# Patient Record
Sex: MALE | Race: AMERICAN INDIAN OR ALASKA NATIVE
[De-identification: names, ages, dates, MRNs, and addresses within clinical notes are randomized per-mention and may not be internally consistent; named-entity substitution may affect disease eponyms.]

---

## 2018-01-02 ENCOUNTER — HOSPITAL ENCOUNTER (EMERGENCY)
Dept: HOSPITAL 5 - ED | Age: 38
Discharge: HOME | End: 2018-01-02
Payer: MEDICAID

## 2018-01-02 VITALS — DIASTOLIC BLOOD PRESSURE: 88 MMHG | SYSTOLIC BLOOD PRESSURE: 145 MMHG

## 2018-01-02 DIAGNOSIS — Z91.013: ICD-10-CM

## 2018-01-02 DIAGNOSIS — R53.1: ICD-10-CM

## 2018-01-02 DIAGNOSIS — J06.9: Primary | ICD-10-CM

## 2018-01-02 PROCEDURE — 87400 INFLUENZA A/B EACH AG IA: CPT

## 2018-01-02 PROCEDURE — 71046 X-RAY EXAM CHEST 2 VIEWS: CPT

## 2018-08-15 ENCOUNTER — HOSPITAL ENCOUNTER (EMERGENCY)
Dept: HOSPITAL 5 - ED | Age: 38
Discharge: LEFT BEFORE DISCHARGE | End: 2018-08-15
Payer: MEDICAID

## 2018-08-15 ENCOUNTER — HOSPITAL ENCOUNTER (EMERGENCY)
Dept: HOSPITAL 5 - ED | Age: 38
Discharge: HOME | End: 2018-08-15
Payer: MEDICAID

## 2018-08-15 VITALS — SYSTOLIC BLOOD PRESSURE: 135 MMHG | DIASTOLIC BLOOD PRESSURE: 76 MMHG

## 2018-08-15 VITALS — DIASTOLIC BLOOD PRESSURE: 87 MMHG | SYSTOLIC BLOOD PRESSURE: 154 MMHG

## 2018-08-15 DIAGNOSIS — K08.89: Primary | ICD-10-CM

## 2018-08-15 DIAGNOSIS — R09.89: ICD-10-CM

## 2018-08-15 DIAGNOSIS — R20.2: Primary | ICD-10-CM

## 2018-08-15 DIAGNOSIS — Z91.013: ICD-10-CM

## 2018-08-15 DIAGNOSIS — Z53.21: ICD-10-CM

## 2018-08-15 DIAGNOSIS — R20.0: ICD-10-CM

## 2018-08-15 LAB
ALBUMIN SERPL-MCNC: 3.9 G/DL (ref 3.9–5)
ALT SERPL-CCNC: 13 UNITS/L (ref 7–56)
APTT BLD: 28.3 SEC. (ref 24.2–36.6)
BASOPHILS # (AUTO): 0 K/MM3 (ref 0–0.1)
BASOPHILS NFR BLD AUTO: 0.5 % (ref 0–1.8)
BUN SERPL-MCNC: 11 MG/DL (ref 9–20)
BUN/CREAT SERPL: 12 %
CALCIUM SERPL-MCNC: 9.1 MG/DL (ref 8.4–10.2)
EOSINOPHIL # BLD AUTO: 0.2 K/MM3 (ref 0–0.4)
EOSINOPHIL NFR BLD AUTO: 2 % (ref 0–4.3)
HCT VFR BLD CALC: 46.1 % (ref 35.5–45.6)
HEMOLYSIS INDEX: 8
HGB BLD-MCNC: 15.6 GM/DL (ref 11.8–15.2)
INR PPP: 0.95 (ref 0.87–1.13)
LYMPHOCYTES # BLD AUTO: 3.1 K/MM3 (ref 1.2–5.4)
LYMPHOCYTES NFR BLD AUTO: 33.2 % (ref 13.4–35)
MCH RBC QN AUTO: 31 PG (ref 28–32)
MCHC RBC AUTO-ENTMCNC: 34 % (ref 32–34)
MCV RBC AUTO: 92 FL (ref 84–94)
MONOCYTES # (AUTO): 0.7 K/MM3 (ref 0–0.8)
MONOCYTES % (AUTO): 7.2 % (ref 0–7.3)
PLATELET # BLD: 240 K/MM3 (ref 140–440)
RBC # BLD AUTO: 5 M/MM3 (ref 3.65–5.03)

## 2018-08-15 PROCEDURE — 80053 COMPREHEN METABOLIC PANEL: CPT

## 2018-08-15 PROCEDURE — 85730 THROMBOPLASTIN TIME PARTIAL: CPT

## 2018-08-15 PROCEDURE — 70450 CT HEAD/BRAIN W/O DYE: CPT

## 2018-08-15 PROCEDURE — 85610 PROTHROMBIN TIME: CPT

## 2018-08-15 PROCEDURE — 72125 CT NECK SPINE W/O DYE: CPT

## 2018-08-15 PROCEDURE — 70360 X-RAY EXAM OF NECK: CPT

## 2018-08-15 PROCEDURE — 36415 COLL VENOUS BLD VENIPUNCTURE: CPT

## 2018-08-15 PROCEDURE — 85025 COMPLETE CBC W/AUTO DIFF WBC: CPT

## 2018-08-15 NOTE — EMERGENCY DEPARTMENT REPORT
ED General Adult HPI





- General


Chief complaint: Medical Clearance


Stated complaint: HAND/PAIN


Time Seen by Provider: 08/15/18 17:03


Source: patient


Mode of arrival: Ambulatory


Limitations: No Limitations





- History of Present Illness


Initial comments: 





Patient had a dental procedure done yesterday.  He had his left upper tooth 

pulled out by the dentist.  After the procedure he started having numbness and 

tingling of both hands and sensation of foreign body in throat.  This has been 

on going since 5 PM yesterday.  He denies any weakness of both extremities.


-: Sudden, days(s) (1)


Location: upper extremity


Radiation: non-radiation


Severity scale (0 -10): 4


Quality: other (Numbness and tingling)


Consistency: constant


Improves with: none


Worsens with: none


Associated Symptoms: other (Foreign body sensation in the throat.).  denies: 

nausea/vomiting, shortness of breath


Treatments Prior to Arrival: none





- Related Data


 Previous Rx's











 Medication  Instructions  Recorded  Last Taken  Type


 


Azithromycin [Zithromax Z-COREY] 250 mg PO DAILY #6 tablet 01/02/18 Unknown Rx


 


Benzonatate [Tessalon Perle] 100 mg PO Q6H #20 capsule 01/02/18 Unknown Rx


 


predniSONE [Deltasone] 40 mg PO QDAY #5 tab 01/02/18 Unknown Rx











 Allergies











Allergy/AdvReac Type Severity Reaction Status Date / Time


 


ALL SEAFOOD AdvReac Severe Anaphylaxis Uncoded 11/16/16 07:55














ED Review of Systems


ROS: 


Stated complaint: HAND/PAIN


Other details as noted in HPI





Comment: All other systems reviewed and negative


Constitutional: denies: chills, fever


Eyes: denies: eye pain


ENT: denies: ear pain


Respiratory: denies: cough, shortness of breath


Cardiovascular: denies: chest pain, palpitations


Endocrine: no symptoms reported


Gastrointestinal: denies: abdominal pain, nausea, vomiting, diarrhea


Genitourinary: denies: urgency, dysuria


Musculoskeletal: denies: back pain


Skin: denies: rash, lesions


Neurological: numbness (Both hands), paresthesias (Both hands).  denies: 

headache, weakness


Psychiatric: denies: anxiety, depression


Hematological/Lymphatic: denies: easy bleeding, easy bruising





ED Past Medical Hx





- Past Medical History


Hx Seizures: Yes (childhood sz, last sz at 16 yrs old)


Additional medical history: previously dx'd with sleep apnea





- Surgical History


Additional Surgical History: rt leg





- Social History


Smoking Status: Never Smoker


Substance Use Type: None





- Medications


Home Medications: 


 Home Medications











 Medication  Instructions  Recorded  Confirmed  Last Taken  Type


 


Azithromycin [Zithromax Z-COREY] 250 mg PO DAILY #6 tablet 01/02/18  Unknown Rx


 


Benzonatate [Tessalon Perle] 100 mg PO Q6H #20 capsule 01/02/18  Unknown Rx


 


predniSONE [Deltasone] 40 mg PO QDAY #5 tab 01/02/18  Unknown Rx














ED Physical Exam





- General


Limitations: No Limitations


General appearance: alert, in no apparent distress





- Head


Head exam: Present: atraumatic, normocephalic, normal inspection





- Eye


Eye exam: Present: normal appearance, PERRL, EOMI, other (Visual acuity R=20/25

, L=20/30, Both=20/25.)


Pupils: Present: normal accommodation





- ENT


ENT exam: Present: normal exam, mucous membranes dry, mucous membranes moist





- Neck


Neck exam: Present: normal inspection, full ROM.  Absent: tenderness





- Respiratory


Respiratory exam: Present: normal lung sounds bilaterally.  Absent: respiratory 

distress, wheezes, rales, rhonchi, stridor





- Cardiovascular


Cardiovascular Exam: Present: regular rate, normal rhythm, normal heart sounds





- GI/Abdominal


GI/Abdominal exam: Present: soft, normal bowel sounds.  Absent: distended, 

tenderness, guarding, rebound, rigid





- Extremities Exam


Extremities exam: Present: normal inspection, full ROM, normal capillary refill





- Back Exam


Back exam: Present: normal inspection, full ROM.  Absent: tenderness





- Neurological Exam


Neurological exam: Present: alert, oriented X3, CN II-XII intact





- Psychiatric


Psychiatric exam: Present: normal affect, normal mood





- Skin


Skin exam: Present: warm, dry, intact, normal color.  Absent: rash





ED Course


 Vital Signs











  08/15/18





  16:21


 


Temperature 97.9 F


 


Pulse Rate 79


 


Respiratory 16





Rate 


 


Blood Pressure 163/89


 


O2 Sat by Pulse 98





Oximetry 














- Reevaluation(s)


Reevaluation #1: 





08/15/18 20:51


I consulted the Tele Neurologist on call Dr Nance. He recommend discharging 

patient to follow up in his outpatient clinic tomorrow morning.





ED Medical Decision Making





- Lab Data


Result diagrams: 


 08/15/18 18:52





 08/15/18 18:52





- Radiology Data


Radiology results: report reviewed, image reviewed





- Medical Decision Making





Bilateral Hand Numbness and Tingling sensation.


Critical care attestation.: 


If time is entered above; I have spent that time in minutes in the direct care 

of this critically ill patient, excluding procedure time.








ED Disposition


Clinical Impression: 


 Paresthesia of both hands





Disposition: DC-01 TO HOME OR SELFCARE


Is pt being admited?: No


Does the pt Need Aspirin: No


Condition: Stable


Instructions:  Paresthesia (ED)


Additional Instructions: 


Please follow up with the Neurologist Dr Nance in his out patient clinic 

tomorrow morning. Office phone number: [phone].


Return to the ED if your condition worsens.


Referrals: 


PRIMARY CARE,MD [Primary Care Provider] - 3-5 Days


ADOLPH NANCE MD [Staff Physician] - 3-5 Days


Time of Disposition: 20:54

## 2018-08-15 NOTE — CAT SCAN REPORT
FINAL REPORT



EXAM:  CT HEAD/BRAIN WO CON



HISTORY:  numbness 



TECHNIQUE:  Standard unenhanced CT of the head at 5.0 millimeter

axial increments. 



PRIORS:  None.



FINDINGS:  

The ventricular system is normal in size and configuration. There

is no evidence for parenchymal volume loss.



There is no evidence for mass lesion, mass effect, midline shift,

acute intracranial hemorrhage, or acute ischemia/ infarction.



No evidence for acute skull fracture is seen.  No abnormality in

the overlying scalp soft tissues is seen.



Visualized paranasal sinuses are clear.



IMPRESSION:  

Negative CT of the head. No acute intracranial process noted.

## 2018-08-15 NOTE — CAT SCAN REPORT
FINAL REPORT



EXAM:  CT CERVICAL SPINE WO CON



HISTORY:  possible radiolucent foreign body 



TECHNIQUE:  Standard CT cervical spine obtained at 2.5 mm axial

increments.  Coronal and sagittal reconstruction was also

performed.



PRIORS:  None.



FINDINGS:  

The vertebral bodies are intact.  There is no evidence for acute

fracture.  There is no evidence for paravertebral soft tissue

swelling.  Alignment is maintained.



IMPRESSION:  

Negative CT of the cervical spine. No evidence for radiopaque

foreign body is seen.

## 2018-08-15 NOTE — XRAY REPORT
FINAL REPORT



EXAM:  XR NECK SOFT TISSUE



HISTORY:  feels like something stuck in his throat 



COMPARISON:  None available. 



FINDINGS:  

Two views of the neck obtained. Visualized upper airway is

patent. No radiopaque foreign body. Prevertebral soft tissues are

within normal limits. Mild degenerative changes at the C5-C6 and

C6-C7 levels of the cervical spine. 



IMPRESSION:  

No radiopaque foreign body. Upper airway is grossly patent by

plain film.